# Patient Record
Sex: FEMALE | Race: ASIAN | Employment: FULL TIME | URBAN - METROPOLITAN AREA
[De-identification: names, ages, dates, MRNs, and addresses within clinical notes are randomized per-mention and may not be internally consistent; named-entity substitution may affect disease eponyms.]

---

## 2017-01-10 ENCOUNTER — APPOINTMENT (OUTPATIENT)
Dept: GENERAL RADIOLOGY | Age: 27
End: 2017-01-10
Attending: FAMILY MEDICINE
Payer: COMMERCIAL

## 2017-01-10 ENCOUNTER — HOSPITAL ENCOUNTER (OUTPATIENT)
Age: 27
Discharge: HOME | End: 2017-01-10
Attending: FAMILY MEDICINE
Payer: COMMERCIAL

## 2017-01-10 VITALS
HEART RATE: 71 BPM | DIASTOLIC BLOOD PRESSURE: 79 MMHG | TEMPERATURE: 98 F | WEIGHT: 105 LBS | RESPIRATION RATE: 19 BRPM | OXYGEN SATURATION: 99 % | BODY MASS INDEX: 20.62 KG/M2 | SYSTOLIC BLOOD PRESSURE: 141 MMHG | HEIGHT: 60 IN

## 2017-01-10 DIAGNOSIS — R07.89 CHEST PAIN, MUSCULOSKELETAL: Primary | ICD-10-CM

## 2017-01-10 PROCEDURE — 99205 OFFICE O/P NEW HI 60 MIN: CPT

## 2017-01-10 PROCEDURE — 93010 ELECTROCARDIOGRAM REPORT: CPT | Performed by: FAMILY MEDICINE

## 2017-01-10 PROCEDURE — 99204 OFFICE O/P NEW MOD 45 MIN: CPT

## 2017-01-10 PROCEDURE — 93010 ELECTROCARDIOGRAM REPORT: CPT

## 2017-01-10 PROCEDURE — 93005 ELECTROCARDIOGRAM TRACING: CPT

## 2017-01-10 PROCEDURE — 71020 XR CHEST PA + LAT CHEST (CPT=71020): CPT

## 2017-01-10 RX ORDER — IBUPROFEN 800 MG/1
800 TABLET ORAL EVERY 8 HOURS PRN
Qty: 30 TABLET | Refills: 0 | Status: SHIPPED | OUTPATIENT
Start: 2017-01-10 | End: 2017-01-17

## 2017-01-10 NOTE — ED INITIAL ASSESSMENT (HPI)
C/o left-sided chest pain that radiates down her left arm for 2 days; had some light-headedness last night and difficulty breathing due to the tightness; denies nausea or vomiting

## 2017-01-10 NOTE — ED PROVIDER NOTES
Patient Seen in: [address]    History   Patient presents with:  Chest Pain Angina (cardiovascular)    Stated Complaint: chest discomfort    HPI    49-year-old female patient with a past medical history significant for m systems reviewed and negative except as noted above. PSFH elements reviewed from today and agreed except as otherwise stated in HPI.     Physical Exam       ED Triage Vitals   BP 01/10/17 1521 141/79 mmHg   Pulse 01/10/17 1521 71   Resp 01/10/17 1521 19 vitals reviewed. ED Course   Labs Reviewed - No data to display   EKG    Rate, intervals and axes as noted on EKG Report.   Rate: 66  Rhythm: Sinus Rhythm  Reading: no acute changes          A second EKG was obtained:  Rate 54bpm, , QT 37, QT medications    ibuprofen 800 MG Oral Tab  Take 1 tablet (800 mg total) by mouth every 8 (eight) hours as needed for Pain.   Qty: 30 tablet Refills: 0

## 2017-09-19 ENCOUNTER — HOSPITAL ENCOUNTER (OUTPATIENT)
Age: 27
Discharge: HOME | End: 2017-09-19
Attending: EMERGENCY MEDICINE
Payer: COMMERCIAL

## 2017-09-19 VITALS
HEIGHT: 60 IN | RESPIRATION RATE: 14 BRPM | SYSTOLIC BLOOD PRESSURE: 112 MMHG | HEART RATE: 83 BPM | TEMPERATURE: 99 F | WEIGHT: 98 LBS | DIASTOLIC BLOOD PRESSURE: 66 MMHG | BODY MASS INDEX: 19.24 KG/M2 | OXYGEN SATURATION: 100 %

## 2017-09-19 DIAGNOSIS — J02.9 ACUTE VIRAL PHARYNGITIS: Primary | ICD-10-CM

## 2017-09-19 LAB — S PYO AG THROAT QL: NEGATIVE

## 2017-09-19 PROCEDURE — 87430 STREP A AG IA: CPT

## 2017-09-19 PROCEDURE — 99213 OFFICE O/P EST LOW 20 MIN: CPT

## 2017-09-19 RX ORDER — AZITHROMYCIN 250 MG/1
TABLET, FILM COATED ORAL
Qty: 1 PACKAGE | Refills: 0 | Status: SHIPPED | OUTPATIENT
Start: 2017-09-19 | End: 2017-09-24

## 2017-09-19 NOTE — ED PROVIDER NOTES
Patient presents with:  Sore Throat      HPI:     April Darylene Never is a 32year old female who presents for evaluation of a chief complaint of sore throat. Associated symptoms include general malaise. Symptoms have been present for the last 2 days.   The p POCT Rapid Strep Once      azithromycin (ZITHROMAX Z-SONIA) 250 MG Oral Tab          Si mg once followed by 250 mg daily x 4 days          Dispense:  1 Package          Refill:  0    Labs performed this visit:    Recent Results (from the past 10 hour(s

## 2017-12-13 ENCOUNTER — HOSPITAL ENCOUNTER (OUTPATIENT)
Age: 27
Discharge: HOME | End: 2017-12-13
Attending: EMERGENCY MEDICINE
Payer: COMMERCIAL

## 2017-12-13 VITALS
HEART RATE: 75 BPM | DIASTOLIC BLOOD PRESSURE: 61 MMHG | OXYGEN SATURATION: 100 % | HEIGHT: 60 IN | TEMPERATURE: 98 F | WEIGHT: 100 LBS | RESPIRATION RATE: 16 BRPM | SYSTOLIC BLOOD PRESSURE: 133 MMHG | BODY MASS INDEX: 19.63 KG/M2

## 2017-12-13 DIAGNOSIS — J40 BRONCHITIS: Primary | ICD-10-CM

## 2017-12-13 PROCEDURE — 99214 OFFICE O/P EST MOD 30 MIN: CPT

## 2017-12-13 PROCEDURE — 87430 STREP A AG IA: CPT

## 2017-12-13 PROCEDURE — 99213 OFFICE O/P EST LOW 20 MIN: CPT

## 2017-12-13 RX ORDER — AZITHROMYCIN 250 MG/1
TABLET, FILM COATED ORAL
Qty: 1 PACKAGE | Refills: 0 | Status: SHIPPED | OUTPATIENT
Start: 2017-12-13 | End: 2017-12-18

## 2017-12-13 NOTE — ED PROVIDER NOTES
Patient presents with:  Cough/URI      HPI:     April Con Cantu is a 32year old female who presents for evaluation of a chief complaint of  a cough Onset of symptoms was 2 weeks ago. The patient also complains of fever sore throat and facial pain.   The p azithromycin (ZITHROMAX Z-SONIA) 250 MG Oral Tab          Si mg once followed by 250 mg daily x 4 days          Dispense:  1 Package          Refill:  0    Labs performed this visit:    Recent Results (from the past 10 hour(s))  -[facility] POCT RAPID STREP

## 2017-12-13 NOTE — ED INITIAL ASSESSMENT (HPI)
Patient has been congested, coughing up yellow mucus, feverish, sore throat and boy aches for 2 weeks; has been taking tylenol around the clock for about 10 days; denies NVD

## 2018-05-22 ENCOUNTER — HOSPITAL ENCOUNTER (OUTPATIENT)
Age: 28
Discharge: HOME | End: 2018-05-22
Attending: FAMILY MEDICINE
Payer: COMMERCIAL

## 2018-05-22 VITALS
OXYGEN SATURATION: 100 % | DIASTOLIC BLOOD PRESSURE: 64 MMHG | SYSTOLIC BLOOD PRESSURE: 115 MMHG | TEMPERATURE: 98 F | WEIGHT: 98 LBS | BODY MASS INDEX: 19.24 KG/M2 | HEART RATE: 69 BPM | HEIGHT: 60 IN | RESPIRATION RATE: 16 BRPM

## 2018-05-22 DIAGNOSIS — N30.01 ACUTE CYSTITIS WITH HEMATURIA: Primary | ICD-10-CM

## 2018-05-22 PROCEDURE — 81025 URINE PREGNANCY TEST: CPT

## 2018-05-22 PROCEDURE — 99214 OFFICE O/P EST MOD 30 MIN: CPT

## 2018-05-22 PROCEDURE — 87086 URINE CULTURE/COLONY COUNT: CPT | Performed by: FAMILY MEDICINE

## 2018-05-22 PROCEDURE — 81002 URINALYSIS NONAUTO W/O SCOPE: CPT

## 2018-05-22 RX ORDER — CIPROFLOXACIN 500 MG/1
500 TABLET, FILM COATED ORAL 2 TIMES DAILY
Qty: 14 TABLET | Refills: 0 | Status: SHIPPED | OUTPATIENT
Start: 2018-05-22 | End: 2018-05-29

## 2018-05-22 NOTE — ED PROVIDER NOTES
Patient Seen in: [address]    History   CC:  Patient presents with:  Urinary Symptoms (urologic)  Cough/URI  Dizziness (neurologic)  Nausea/Vomiting/Diarrhea (gastrointestinal): diarhea only    Stated Complaint: possibl reactive acln's;     thyroid:  no enlargement/tenderness/nodules; no carotid    bruit or JVD   Heart   S1 S2 w/RRR   Lungs:     Clear to auscultation bilaterally, respirations unlabored   Chest Wall:    No tenderness or deformity   Abd:   Soft, Nt, No OSM;

## 2018-05-22 NOTE — ED INITIAL ASSESSMENT (HPI)
Patient states feeling restless, watery stools since starting Doxyclyine abc (for acne) x 1 month ago, burning on urination, increased frequency/urgency in urination since yesterday, nasal congestion, some dizziness.   Patient denies fever, denies back pain

## (undated) NOTE — ED AVS SNAPSHOT
[facility] in [place] [address]    Phone:  [phone]    Fax:  [phone]           April J Toribio   MRN: [id number]    Department:  [facility]   Date of Visit: lightheadedness or headache please go immediately to the ER. Trial of Ibuprofen 800mg after meals up to every 8 hours. If abdominal pain, dark stools or any other symptoms develop please stop medication and return to clinic.      Refrain from doing upper care or specialist physician may see patients referred from the [facility]. Follow-up care is at the discretion of that Physician.   If you need additional assistance selecting a physician, you may call the [facility] [id number]  [place] [address]) [phone]                Additional Information       We are concerned for your overall well being:    - If you are a smoker or have smoked in the last 12 months, we encourage you to explore op